# Patient Record
Sex: MALE | URBAN - METROPOLITAN AREA
[De-identification: names, ages, dates, MRNs, and addresses within clinical notes are randomized per-mention and may not be internally consistent; named-entity substitution may affect disease eponyms.]

---

## 2022-04-27 ENCOUNTER — OFFICE VISIT (OUTPATIENT)
Dept: URBAN - METROPOLITAN AREA CLINIC 1 | Facility: CLINIC | Age: 57
End: 2022-04-27
Payer: COMMERCIAL

## 2022-04-27 DIAGNOSIS — H11.042 PERIPHERAL PTERYGIUM, STATIONARY, LEFT EYE: ICD-10-CM

## 2022-04-27 DIAGNOSIS — H47.233 GLAUCOMATOUS OPTIC ATROPHY, BILATERAL: ICD-10-CM

## 2022-04-27 DIAGNOSIS — H25.13 AGE-RELATED NUCLEAR CATARACT, BILATERAL: Primary | ICD-10-CM

## 2022-04-27 DIAGNOSIS — H40.013 OPEN ANGLE WITH BORDERLINE FINDINGS, LOW RISK, BILATERAL: ICD-10-CM

## 2022-04-27 PROCEDURE — 99203 OFFICE O/P NEW LOW 30 MIN: CPT | Performed by: OPHTHALMOLOGY

## 2022-04-27 ASSESSMENT — INTRAOCULAR PRESSURE
OD: 13
OS: 14

## 2022-04-27 NOTE — IMPRESSION/PLAN
Impression: Peripheral pterygium, stationary, left eye: H11.042. Plan: Discussed diagnosis in detail with patient. Discussed treatment options with patient. Reassured patient of current condition and treatment. Will continue to observe condition and or symptoms.

## 2022-04-27 NOTE — IMPRESSION/PLAN
Impression: Open angle with borderline findings, low risk, bilateral: H40.013. Plan: Discussed diagnosis in detail with patient. Reassured patient of current condition and treatment. Will continue to observe condition and or symptoms. Will continue to monitor IOP.

## 2022-04-27 NOTE — IMPRESSION/PLAN
Impression: Glaucomatous optic atrophy, bilateral: H47.233. Plan: Patient with stable ONC OU and no evidence of glaucomatous progression. No treatment needed at this time. We will continue to observe closely. 

RV 6 MOS RNFL DFE

## 2022-11-02 ENCOUNTER — Encounter (OUTPATIENT)
Dept: URBAN - METROPOLITAN AREA CLINIC 1 | Facility: CLINIC | Age: 57
End: 2022-11-02